# Patient Record
Sex: MALE | Race: WHITE | NOT HISPANIC OR LATINO | Employment: OTHER | URBAN - NONMETROPOLITAN AREA
[De-identification: names, ages, dates, MRNs, and addresses within clinical notes are randomized per-mention and may not be internally consistent; named-entity substitution may affect disease eponyms.]

---

## 2023-11-08 PROBLEM — I25.10 ATHEROSCLEROSIS OF NATIVE CORONARY ARTERY OF NATIVE HEART WITHOUT ANGINA PECTORIS: Status: ACTIVE | Noted: 2023-11-08

## 2023-11-08 PROBLEM — Z98.61 STATUS POST CORONARY ANGIOPLASTY: Status: ACTIVE | Noted: 2023-11-08

## 2023-11-08 PROBLEM — E11.9 CONTROLLED TYPE 2 DIABETES MELLITUS WITHOUT COMPLICATION, WITHOUT LONG-TERM CURRENT USE OF INSULIN (MULTI): Status: ACTIVE | Noted: 2023-11-08

## 2023-11-08 PROBLEM — E78.5 DYSLIPIDEMIA: Status: ACTIVE | Noted: 2023-11-08

## 2023-11-08 PROBLEM — I10 ESSENTIAL HYPERTENSION: Status: ACTIVE | Noted: 2023-11-08

## 2023-11-08 RX ORDER — AMLODIPINE BESYLATE 10 MG/1
1 TABLET ORAL DAILY
COMMUNITY
Start: 2021-05-04 | End: 2023-11-09

## 2023-11-08 RX ORDER — PAROXETINE HYDROCHLORIDE 40 MG/1
40 TABLET, FILM COATED ORAL EVERY MORNING
COMMUNITY
Start: 2022-04-01

## 2023-11-08 RX ORDER — ATORVASTATIN CALCIUM 20 MG/1
1 TABLET, FILM COATED ORAL NIGHTLY
COMMUNITY
Start: 2022-04-05 | End: 2024-02-22 | Stop reason: SDUPTHER

## 2023-11-08 RX ORDER — NEBIVOLOL 10 MG/1
1 TABLET ORAL DAILY
COMMUNITY
Start: 2022-04-01 | End: 2024-02-22 | Stop reason: SDUPTHER

## 2023-11-08 RX ORDER — VALSARTAN AND HYDROCHLOROTHIAZIDE 320; 25 MG/1; MG/1
1 TABLET, FILM COATED ORAL DAILY
COMMUNITY
Start: 2021-05-04 | End: 2024-02-22 | Stop reason: SDUPTHER

## 2023-11-08 RX ORDER — ASPIRIN 81 MG/1
1 TABLET ORAL DAILY
COMMUNITY
End: 2024-02-22 | Stop reason: SDUPTHER

## 2023-11-08 RX ORDER — METFORMIN HYDROCHLORIDE 500 MG/1
2 TABLET, EXTENDED RELEASE ORAL 2 TIMES DAILY
COMMUNITY

## 2023-11-09 ENCOUNTER — OFFICE VISIT (OUTPATIENT)
Dept: CARDIOLOGY | Facility: CLINIC | Age: 73
End: 2023-11-09
Payer: MEDICARE

## 2023-11-09 VITALS
BODY MASS INDEX: 37.77 KG/M2 | WEIGHT: 255 LBS | HEIGHT: 69 IN | SYSTOLIC BLOOD PRESSURE: 126 MMHG | DIASTOLIC BLOOD PRESSURE: 75 MMHG | HEART RATE: 72 BPM

## 2023-11-09 DIAGNOSIS — I10 ESSENTIAL HYPERTENSION: ICD-10-CM

## 2023-11-09 DIAGNOSIS — E78.5 DYSLIPIDEMIA: ICD-10-CM

## 2023-11-09 DIAGNOSIS — Z98.61 STATUS POST CORONARY ANGIOPLASTY: ICD-10-CM

## 2023-11-09 DIAGNOSIS — E11.9 CONTROLLED TYPE 2 DIABETES MELLITUS WITHOUT COMPLICATION, WITHOUT LONG-TERM CURRENT USE OF INSULIN (MULTI): ICD-10-CM

## 2023-11-09 DIAGNOSIS — I25.10 ATHEROSCLEROSIS OF NATIVE CORONARY ARTERY OF NATIVE HEART WITHOUT ANGINA PECTORIS: ICD-10-CM

## 2023-11-09 PROCEDURE — 3077F SYST BP >= 140 MM HG: CPT | Performed by: INTERNAL MEDICINE

## 2023-11-09 PROCEDURE — 3078F DIAST BP <80 MM HG: CPT | Performed by: INTERNAL MEDICINE

## 2023-11-09 PROCEDURE — 1036F TOBACCO NON-USER: CPT | Performed by: INTERNAL MEDICINE

## 2023-11-09 PROCEDURE — 3080F DIAST BP >= 90 MM HG: CPT | Performed by: INTERNAL MEDICINE

## 2023-11-09 PROCEDURE — 1159F MED LIST DOCD IN RCRD: CPT | Performed by: INTERNAL MEDICINE

## 2023-11-09 PROCEDURE — 99214 OFFICE O/P EST MOD 30 MIN: CPT | Performed by: INTERNAL MEDICINE

## 2023-11-09 PROCEDURE — 3074F SYST BP LT 130 MM HG: CPT | Performed by: INTERNAL MEDICINE

## 2023-11-09 RX ORDER — AMLODIPINE BESYLATE 5 MG/1
5 TABLET ORAL DAILY
Qty: 90 TABLET | Refills: 3 | Status: SHIPPED | OUTPATIENT
Start: 2023-11-09 | End: 2024-02-22 | Stop reason: SDUPTHER

## 2023-11-09 RX ORDER — AMLODIPINE BESYLATE 5 MG/1
5 TABLET ORAL DAILY
COMMUNITY
End: 2023-11-09 | Stop reason: SDUPTHER

## 2023-11-09 NOTE — PROGRESS NOTES
"Subjective   Jamal Bean is a 72 y.o. male       Chief Complaint    Follow-up          HPI   Patient is in the office for follow-up for the problems noted below.  He has had no cardiac events since he was last seen.  He denies any chest pain or dyspnea.  He tried to lose weight but has not been successful.  He maintains active lifestyle.  Does not smoke and does not drink alcohol excessively.  He has not needed to take nitroglycerin.  His examination essentially normal except for his obesity.  His lab data were requested and his medication were renewed at his request.  He will come back to see me in 6 months or sooner if needed.    ASSESSMENT AND PLAN:      1. Status post angioplasty, LAD and the marginal March 2017. He was advised to continue present medical therapy with no changes  2. controlled hypertension. He is to stay on present medical therapy with no changes  3. Dyslipidemia, on statin therapy to be monitored. Lipid profile is ordered  4.  Class II obesity. Encouraged the patient cut back calorie intake and exercise, his weight has dropped several pounds from last visit  5. diabetes on medical therapy managed by endocrinology.       [facility]io Forbes [facility], [facility]   Review of Systems   All other systems reviewed and are negative.         Visit Vitals  /90 (BP Location: Right arm, Patient Position: Sitting)   Pulse 72   Ht 1.753 m (5' 9\")   Wt 116 kg (255 lb)   BMI 37.66 kg/m²   Smoking Status Never   BSA 2.38 m²        Objective   Physical Exam  Constitutional:       Appearance: Normal appearance. He is normal weight.   HENT:      Nose: Nose normal.   Neck:      Vascular: No carotid bruit.   Cardiovascular:      Rate and Rhythm: Normal rate.      Pulses: Normal pulses.      Heart sounds: Normal heart sounds.   Pulmonary:      Effort: Pulmonary effort is normal.   Abdominal:      General: Bowel sounds are normal.      Palpations: Abdomen is soft.   Genitourinary:     Rectum: Normal.   Musculoskeletal:    "      General: Normal range of motion.      Cervical back: Normal range of motion.      Right lower leg: No edema.      Left lower leg: No edema.   Skin:     General: Skin is warm and dry.   Neurological:      General: No focal deficit present.      Mental Status: He is alert.   Psychiatric:         Mood and Affect: Mood normal.         Behavior: Behavior normal.         Thought Content: Thought content normal.         Judgment: Judgment normal.         Current Medications    Current Outpatient Medications:     amLODIPine (Norvasc) 5 mg tablet, Take 1 tablet (5 mg) by mouth once daily., Disp: , Rfl:     aspirin 81 mg EC tablet, Take 1 tablet (81 mg) by mouth once daily., Disp: , Rfl:     atorvastatin (Lipitor) 20 mg tablet, Take 1 tablet (20 mg) by mouth once daily at bedtime., Disp: , Rfl:     metFORMIN  mg 24 hr tablet, Take 2 tablets (1,000 mg) by mouth 2 times a day., Disp: , Rfl:     nebivolol (Bystolic) 10 mg tablet, Take 1 tablet (10 mg) by mouth once daily., Disp: , Rfl:     PARoxetine (Paxil) 40 mg tablet, Take 1 tablet (40 mg) by mouth once daily in the morning., Disp: , Rfl:     valsartan-hydrochlorothiazide (Diovan-HCT) 320-25 mg tablet, Take 1 tablet by mouth once daily., Disp: , Rfl:                      Assessment/Plan   1. Atherosclerosis of native coronary artery of native heart without angina pectoris        2. Status post coronary angioplasty        3. Essential hypertension        4. Dyslipidemia        5. Controlled type 2 diabetes mellitus without complication, without long-term current use of insulin (CMS/[facility])

## 2024-02-22 DIAGNOSIS — E78.5 DYSLIPIDEMIA: ICD-10-CM

## 2024-02-22 DIAGNOSIS — I25.10 ATHEROSCLEROSIS OF NATIVE CORONARY ARTERY OF NATIVE HEART WITHOUT ANGINA PECTORIS: ICD-10-CM

## 2024-02-22 DIAGNOSIS — I10 ESSENTIAL HYPERTENSION: ICD-10-CM

## 2024-02-23 RX ORDER — AMLODIPINE BESYLATE 5 MG/1
5 TABLET ORAL DAILY
Qty: 90 TABLET | Refills: 3 | Status: SHIPPED | OUTPATIENT
Start: 2024-02-23 | End: 2025-02-22

## 2024-02-23 RX ORDER — NEBIVOLOL 10 MG/1
10 TABLET ORAL DAILY
Qty: 90 TABLET | Refills: 3 | Status: SHIPPED | OUTPATIENT
Start: 2024-02-23 | End: 2024-05-09 | Stop reason: ALTCHOICE

## 2024-02-23 RX ORDER — ATORVASTATIN CALCIUM 20 MG/1
20 TABLET, FILM COATED ORAL NIGHTLY
Qty: 90 TABLET | Refills: 3 | Status: SHIPPED | OUTPATIENT
Start: 2024-02-23 | End: 2025-02-22

## 2024-02-23 RX ORDER — VALSARTAN AND HYDROCHLOROTHIAZIDE 320; 25 MG/1; MG/1
1 TABLET, FILM COATED ORAL DAILY
Qty: 90 TABLET | Refills: 3 | Status: SHIPPED | OUTPATIENT
Start: 2024-02-23 | End: 2025-02-22

## 2024-02-23 RX ORDER — ASPIRIN 81 MG/1
81 TABLET ORAL DAILY
Qty: 90 TABLET | Refills: 3 | Status: SHIPPED | OUTPATIENT
Start: 2024-02-23 | End: 2024-05-09 | Stop reason: DRUGHIGH

## 2024-05-09 ENCOUNTER — OFFICE VISIT (OUTPATIENT)
Dept: CARDIOLOGY | Facility: CLINIC | Age: 74
End: 2024-05-09
Payer: MEDICARE

## 2024-05-09 VITALS
SYSTOLIC BLOOD PRESSURE: 130 MMHG | WEIGHT: 249 LBS | HEIGHT: 69 IN | HEART RATE: 60 BPM | BODY MASS INDEX: 36.88 KG/M2 | DIASTOLIC BLOOD PRESSURE: 80 MMHG

## 2024-05-09 DIAGNOSIS — E78.5 DYSLIPIDEMIA: ICD-10-CM

## 2024-05-09 DIAGNOSIS — Z98.61 STATUS POST CORONARY ANGIOPLASTY: ICD-10-CM

## 2024-05-09 DIAGNOSIS — I48.91 ATRIAL FIBRILLATION, UNSPECIFIED TYPE (MULTI): ICD-10-CM

## 2024-05-09 DIAGNOSIS — I10 ESSENTIAL HYPERTENSION: ICD-10-CM

## 2024-05-09 DIAGNOSIS — E11.9 CONTROLLED TYPE 2 DIABETES MELLITUS WITHOUT COMPLICATION, WITHOUT LONG-TERM CURRENT USE OF INSULIN (MULTI): ICD-10-CM

## 2024-05-09 DIAGNOSIS — I25.10 ATHEROSCLEROSIS OF NATIVE CORONARY ARTERY OF NATIVE HEART WITHOUT ANGINA PECTORIS: ICD-10-CM

## 2024-05-09 DIAGNOSIS — Z87.891 FORMER SMOKER: ICD-10-CM

## 2024-05-09 PROCEDURE — 1036F TOBACCO NON-USER: CPT | Performed by: INTERNAL MEDICINE

## 2024-05-09 PROCEDURE — 1159F MED LIST DOCD IN RCRD: CPT | Performed by: INTERNAL MEDICINE

## 2024-05-09 PROCEDURE — 99214 OFFICE O/P EST MOD 30 MIN: CPT | Performed by: INTERNAL MEDICINE

## 2024-05-09 PROCEDURE — 3008F BODY MASS INDEX DOCD: CPT | Performed by: INTERNAL MEDICINE

## 2024-05-09 PROCEDURE — 3075F SYST BP GE 130 - 139MM HG: CPT | Performed by: INTERNAL MEDICINE

## 2024-05-09 PROCEDURE — 3079F DIAST BP 80-89 MM HG: CPT | Performed by: INTERNAL MEDICINE

## 2024-05-09 PROCEDURE — 93000 ELECTROCARDIOGRAM COMPLETE: CPT | Performed by: INTERNAL MEDICINE

## 2024-05-09 RX ORDER — ASPIRIN 81 MG/1
81 TABLET ORAL 2 TIMES WEEKLY
Qty: 24 TABLET | Refills: 3 | Status: SHIPPED | OUTPATIENT
Start: 2024-05-09 | End: 2025-05-09

## 2024-05-09 RX ORDER — METOPROLOL TARTRATE 50 MG/1
50 TABLET ORAL 2 TIMES DAILY
Qty: 180 TABLET | Refills: 3 | Status: SHIPPED | OUTPATIENT
Start: 2024-05-09 | End: 2025-05-09

## 2024-05-09 ASSESSMENT — ENCOUNTER SYMPTOMS: SHORTNESS OF BREATH: 1

## 2024-05-09 NOTE — PROGRESS NOTES
Subjective   Jamal Bean is a 73 y.o. male       Chief Complaint    Follow-up          HPI   Patient is in the office for follow-up for the problems noted below and reports no symptoms of palpitations, chest pain dyspnea orthopnea PND or lower extremity edema, he remains significantly obese and class II, he tells me he does not follow with diabetes specialist and does not follow with his PCP.  He was encouraged to follow-up with his primary care physicians.  He was found to have irregular rhythm on examination confirmed by EKG to be due to atrial fibrillation with a heart rate 106 bpm.  He was not aware of it.  His lungs sounded normal he had no cardiac murmurs and no lower extremity edema.  He failed to do the blood work that we  requested back in November 2023.    ASSESSMENT AND PLAN:      1-atrial fibrillation discovered for the first time that was totally asymptomatic.  Patient was educated about atrial fibrillation specifically allergy and potential complications.  He is agreeable to follow my recommendations of starting Xarelto 20 mg daily and switching from nebivolol to metoprolol to tartrate 50 mg twice daily.  We will not initiate antiarrhythmic therapy and now plans for cardioversion under adequate anticoagulation has been put in place.  Will obtain echocardiogram to assess ejection fraction, he would likely be started initially on amiodarone Tridestra normal sinus rhythm followed by cardioversion  2. Status post angioplasty, LAD and the marginal March 2017. He was advised to continue present medical therapy with no changes  3. controlled hypertension. He is to stay on present medical therapy with no changes  4. Dyslipidemia, on statin therapy to be monitored. Lipid profile is ordered  5.  Class II obesity. Encouraged the patient cut back calorie intake and exercise, his weight has dropped several pounds from last visit  6. diabetes on medical therapy managed by endocrinology.  He was advised to make  "sure that he has routine follow-up with his diabetes specialist which she has failed to do        Osorio Forbes MD, [facility]   Review of Systems   Respiratory:  Positive for shortness of breath.    All other systems reviewed and are negative.           Vitals:    05/09/24 0937 05/09/24 0948   BP: (!) 140/100 130/80   BP Location: Left arm Right arm   Patient Position: Sitting    Pulse: 60    Weight: 113 kg (249 lb)    Height: 1.753 m (5' 9\")         Objective   Physical Exam  Constitutional:       Appearance: Normal appearance.   HENT:      Nose: Nose normal.   Neck:      Vascular: No carotid bruit.   Cardiovascular:      Rate and Rhythm: Tachycardia present. Rhythm irregularly irregular.      Pulses: Normal pulses.      Heart sounds: Normal heart sounds.   Pulmonary:      Effort: Pulmonary effort is normal.   Abdominal:      General: Bowel sounds are normal.      Palpations: Abdomen is soft.   Musculoskeletal:         General: Normal range of motion.      Cervical back: Normal range of motion.      Right lower leg: No edema.      Left lower leg: No edema.   Skin:     General: Skin is warm and dry.   Neurological:      General: No focal deficit present.      Mental Status: He is alert.   Psychiatric:         Mood and Affect: Mood normal.         Behavior: Behavior normal.         Thought Content: Thought content normal.         Judgment: Judgment normal.         Allergies  Patient has no known allergies.     Current Medications    Current Outpatient Medications:     amLODIPine (Norvasc) 5 mg tablet, Take 1 tablet (5 mg) by mouth once daily., Disp: 90 tablet, Rfl: 3    aspirin 81 mg EC tablet, Take 1 tablet (81 mg) by mouth once daily., Disp: 90 tablet, Rfl: 3    atorvastatin (Lipitor) 20 mg tablet, Take 1 tablet (20 mg) by mouth once daily at bedtime., Disp: 90 tablet, Rfl: 3    metFORMIN  mg 24 hr tablet, Take 2 tablets (1,000 mg) by mouth 2 times a day., Disp: , Rfl:     nebivolol (Bystolic) 10 mg tablet, " Take 1 tablet (10 mg) by mouth once daily., Disp: 90 tablet, Rfl: 3    PARoxetine (Paxil) 40 mg tablet, Take 1 tablet (40 mg) by mouth once daily in the morning., Disp: , Rfl:     valsartan-hydrochlorothiazide (Diovan-HCT) 320-25 mg tablet, Take 1 tablet by mouth once daily., Disp: 90 tablet, Rfl: 3                     Assessment/Plan   1. Atrial fibrillation, unspecified type (Multi)        2. Atherosclerosis of native coronary artery of native heart without angina pectoris  Follow Up In Cardiology      3. Status post coronary angioplasty  Follow Up In Cardiology      4. Essential hypertension  Follow Up In Cardiology      5. Dyslipidemia        6. Controlled type 2 diabetes mellitus without complication, without long-term current use of insulin (Multi)        7. Body mass index (BMI) 36.0-36.9, adult        8. Former smoker                 Scribe Attestation  By signing my name below, Marina RAREGUIN LPN, Scribe   attest that this documentation has been prepared under the direction and in the presence of Osorio Forbes MD.     Provider Attestation - Scribe documentation    All medical record entries made by the Scribe were at my direction and personally dictated by me. I have reviewed the chart and agree that the record accurately reflects my personal performance of the history, physical exam, discussion and plan.

## 2024-05-09 NOTE — PATIENT INSTRUCTIONS
Please bring all medicines, vitamins, and herbal supplements with you when you come to the office.    Prescriptions will not be filled unless you are compliant with your follow up appointments or have a follow up appointment scheduled as per instruction of your physician. Refills should be requested at the time of your visit.     BMI was above normal measurement. Current weight: 113 kg (249 lb)  Weight change since last visit (-) denotes wt loss -6 lbs   Weight loss needed to achieve BMI 25: 80.1 Lbs  Weight loss needed to achieve BMI 30: 46.3 Lbs  Provided instructions on dietary changes.    Echo   B/p check one month  Follow up 6 weeks    Stop Bystolic  Start Metoprolol tart 50 mg one tablet two times daily  Xarelto 20mg daily with meal  Lab prior to vist

## 2024-05-09 NOTE — LETTER
May 9, 2024     Micky Lawrence MD  [address]  [facility] [address]    Patient: Jamal Bean   YOB: 1950   Date of Visit: 5/9/2024       Dear Dr. Micky Lawrence MD:    Thank you for referring Jamal Bean to me for evaluation. Below are my notes for this consultation.  If you have questions, please do not hesitate to call me. I look forward to following your patient along with you.       Sincerely,     Osorio Forbes MD      CC: No Recipients  ______________________________________________________________________________________    Subjective   Jamal Bean is a 73 y.o. male       Chief Complaint    Follow-up          HPI   Patient is in the office for follow-up for the problems noted below and reports no symptoms of palpitations, chest pain dyspnea orthopnea PND or lower extremity edema, he remains significantly obese and class II, he tells me he does not follow with diabetes specialist and does not follow with his PCP.  He was encouraged to follow-up with his primary care physicians.  He was found to have irregular rhythm on examination confirmed by EKG to be due to atrial fibrillation with a heart rate 106 bpm.  He was not aware of it.  His lungs sounded normal he had no cardiac murmurs and no lower extremity edema.  He failed to do the blood work that we  requested back in November 2023.    ASSESSMENT AND PLAN:      1-atrial fibrillation discovered for the first time that was totally asymptomatic.  Patient was educated about atrial fibrillation specifically allergy and potential complications.  He is agreeable to follow my recommendations of starting Xarelto 20 mg daily and switching from nebivolol to metoprolol to tartrate 50 mg twice daily.  We will not initiate antiarrhythmic therapy and now plans for cardioversion under adequate anticoagulation has been put in place.  Will obtain echocardiogram to assess ejection fraction, he would likely be started initially on amiodarone  "Amaya normal sinus rhythm followed by cardioversion  2. Status post angioplasty, LAD and the marginal March 2017. He was advised to continue present medical therapy with no changes  3. controlled hypertension. He is to stay on present medical therapy with no changes  4. Dyslipidemia, on statin therapy to be monitored. Lipid profile is ordered  5.  Class II obesity. Encouraged the patient cut back calorie intake and exercise, his weight has dropped several pounds from last visit  6. diabetes on medical therapy managed by endocrinology.  He was advised to make sure that he has routine follow-up with his diabetes specialist which she has failed to do        Osorio Forbes MD, [facility]   Review of Systems   Respiratory:  Positive for shortness of breath.    All other systems reviewed and are negative.           Vitals:    05/09/24 0937 05/09/24 0948   BP: (!) 140/100 130/80   BP Location: Left arm Right arm   Patient Position: Sitting    Pulse: 60    Weight: 113 kg (249 lb)    Height: 1.753 m (5' 9\")         Objective   Physical Exam  Constitutional:       Appearance: Normal appearance.   HENT:      Nose: Nose normal.   Neck:      Vascular: No carotid bruit.   Cardiovascular:      Rate and Rhythm: Tachycardia present. Rhythm irregularly irregular.      Pulses: Normal pulses.      Heart sounds: Normal heart sounds.   Pulmonary:      Effort: Pulmonary effort is normal.   Abdominal:      General: Bowel sounds are normal.      Palpations: Abdomen is soft.   Musculoskeletal:         General: Normal range of motion.      Cervical back: Normal range of motion.      Right lower leg: No edema.      Left lower leg: No edema.   Skin:     General: Skin is warm and dry.   Neurological:      General: No focal deficit present.      Mental Status: He is alert.   Psychiatric:         Mood and Affect: Mood normal.         Behavior: Behavior normal.         Thought Content: Thought content normal.         Judgment: Judgment normal. "         Allergies  Patient has no known allergies.     Current Medications    Current Outpatient Medications:   •  amLODIPine (Norvasc) 5 mg tablet, Take 1 tablet (5 mg) by mouth once daily., Disp: 90 tablet, Rfl: 3  •  aspirin 81 mg EC tablet, Take 1 tablet (81 mg) by mouth once daily., Disp: 90 tablet, Rfl: 3  •  atorvastatin (Lipitor) 20 mg tablet, Take 1 tablet (20 mg) by mouth once daily at bedtime., Disp: 90 tablet, Rfl: 3  •  metFORMIN  mg 24 hr tablet, Take 2 tablets (1,000 mg) by mouth 2 times a day., Disp: , Rfl:   •  nebivolol (Bystolic) 10 mg tablet, Take 1 tablet (10 mg) by mouth once daily., Disp: 90 tablet, Rfl: 3  •  PARoxetine (Paxil) 40 mg tablet, Take 1 tablet (40 mg) by mouth once daily in the morning., Disp: , Rfl:   •  valsartan-hydrochlorothiazide (Diovan-HCT) 320-25 mg tablet, Take 1 tablet by mouth once daily., Disp: 90 tablet, Rfl: 3                     Assessment/Plan   1. Atrial fibrillation, unspecified type (Multi)        2. Atherosclerosis of native coronary artery of native heart without angina pectoris  Follow Up In Cardiology      3. Status post coronary angioplasty  Follow Up In Cardiology      4. Essential hypertension  Follow Up In Cardiology      5. Dyslipidemia        6. Controlled type 2 diabetes mellitus without complication, without long-term current use of insulin (Multi)        7. Body mass index (BMI) 36.0-36.9, adult        8. Former smoker                 Scribe Attestation  By signing my name below, Marina ARREGUIN LPN, Scribe   attest that this documentation has been prepared under the direction and in the presence of Osorio Forbes MD.     Provider Attestation - Scribe documentation    All medical record entries made by the Scribe were at my direction and personally dictated by me. I have reviewed the chart and agree that the record accurately reflects my personal performance of the history, physical exam, discussion and plan.

## 2024-06-11 ENCOUNTER — APPOINTMENT (OUTPATIENT)
Dept: CARDIOLOGY | Facility: CLINIC | Age: 74
End: 2024-06-11
Payer: MEDICARE

## 2024-06-19 LAB
NON-UH HIE ALANINE AMINOTRANSFERASE: 93 U/L (ref 7–52)
NON-UH HIE ANION GAP: 11.8 (ref 6–15)
NON-UH HIE ASPARTATE AMINO TRANSFERASE: 47 U/L (ref 13–39)
NON-UH HIE BASOPHILS # (AUTO): 0.1 10*3/UL (ref 0–0.2)
NON-UH HIE BASOPHILS % (AUTO): 2 %
NON-UH HIE BLOOD UREA NITROGEN: 14 MG/DL (ref 7–25)
NON-UH HIE CALCIUM: 9.7 MG/DL (ref 8.6–10.3)
NON-UH HIE CARBON DIOXIDE: 32.7 MMOL/L (ref 21–31)
NON-UH HIE CHLORIDE: 99 MMOL/L (ref 98–107)
NON-UH HIE CHOL/HDL RATIO: 6.3
NON-UH HIE CHOLESTEROL: 232 MG/DL (ref 140–200)
NON-UH HIE CREATININE: 0.87 MG/DL (ref 0.7–1.3)
NON-UH HIE EOSINOPHILS # (AUTO): 0.2 10*3/UL (ref 0–0.45)
NON-UH HIE EOSINOPHILS % (AUTO): 3.3 %
NON-UH HIE ESTIMATED GFR: > 60
NON-UH HIE GLUCOSE: 300 MG/DL (ref 70–100)
NON-UH HIE HDL CHOLESTEROL: 37 MG/DL (ref 23–92)
NON-UH HIE HEMATOCRIT: 44.2 % (ref 38.8–50)
NON-UH HIE HEMOGLOBIN: 15.3 G/DL (ref 13–17)
NON-UH HIE LDL CHOLESTEROL,CALCULATED: 154 MG/DL (ref 0–100)
NON-UH HIE LYMPHOCYTES # (AUTO): 1.9 10*3/UL (ref 1–4.8)
NON-UH HIE LYMPHOCYTES % (AUTO): 30.6 %
NON-UH HIE MEAN CORPUSCULAR HEMOGLOBIN: 31.2 PG (ref 27.5–35.2)
NON-UH HIE MEAN CORPUSCULAR HGB CONC: 34.7 G/DL (ref 32.5–35.6)
NON-UH HIE MEAN CORPUSCULAR VOLUME: 89.8 FL (ref 83.5–101)
NON-UH HIE MEAN PLATELET VOLUME: 9.4 FL (ref 6.6–10.1)
NON-UH HIE MONOCYTES # (AUTO): 0.5 10*3/UL (ref 0–0.8)
NON-UH HIE MONOCYTES % (AUTO): 8.5 %
NON-UH HIE NEUTROPHILS # (AUTO): 3.5 10*3/UL (ref 1.8–7.7)
NON-UH HIE NEUTROPHILS % (AUTO): 55.6 %
NON-UH HIE NRBC%: 0 /100{WBC} (ref 0–0.5)
NON-UH HIE PLATELET COUNT: 131 10*3/UL (ref 150–450)
NON-UH HIE POTASSIUM: 4.5 MMOL/L (ref 3.5–5.1)
NON-UH HIE RED BLOOD COUNT: 4.92 (ref 3.9–5.6)
NON-UH HIE RED CELL DISTRIBUTION WIDTH: 13.4 % (ref 12–14.8)
NON-UH HIE SODIUM: 139 MMOL/L (ref 136–145)
NON-UH HIE TRIGLYCERIDE W/REFLEX: 204 MG/DL (ref 0–149)
NON-UH HIE UNCORRECTED WBC: 6.3 10*3/UL (ref 4.1–10.5)
NON-UH HIE VLDL CHOLESTEROL: 40 MG/DL
NON-UH HIE WHITE BLOOD COUNT: 6.3 10*3/UL (ref 4.1–10.5)

## 2024-06-24 ENCOUNTER — TELEPHONE (OUTPATIENT)
Dept: CARDIOLOGY | Facility: CLINIC | Age: 74
End: 2024-06-24

## 2024-06-24 ENCOUNTER — APPOINTMENT (OUTPATIENT)
Dept: CARDIOLOGY | Facility: CLINIC | Age: 74
End: 2024-06-24
Payer: MEDICARE

## 2024-06-24 VITALS
SYSTOLIC BLOOD PRESSURE: 150 MMHG | BODY MASS INDEX: 36.29 KG/M2 | DIASTOLIC BLOOD PRESSURE: 88 MMHG | WEIGHT: 245 LBS | HEIGHT: 69 IN | HEART RATE: 94 BPM

## 2024-06-24 DIAGNOSIS — I48.91 ATRIAL FIBRILLATION, UNSPECIFIED TYPE (MULTI): ICD-10-CM

## 2024-06-24 DIAGNOSIS — E78.5 DYSLIPIDEMIA: ICD-10-CM

## 2024-06-24 PROCEDURE — 93000 ELECTROCARDIOGRAM COMPLETE: CPT | Performed by: INTERNAL MEDICINE

## 2024-06-24 RX ORDER — ROSUVASTATIN CALCIUM 10 MG/1
10 TABLET, COATED ORAL DAILY
Qty: 90 TABLET | Refills: 3 | Status: SHIPPED | OUTPATIENT
Start: 2024-06-24 | End: 2025-06-24

## 2024-06-24 RX ORDER — EZETIMIBE 10 MG/1
10 TABLET ORAL DAILY
Qty: 90 TABLET | Refills: 3 | Status: SHIPPED | OUTPATIENT
Start: 2024-06-24 | End: 2025-06-24

## 2024-06-24 NOTE — PROGRESS NOTES
"Patient here for at EKG visit ordered by Dr. Forbes due to A-Fib. Dr. Forbes in suite. Patient here due to switching from Nebivolol to Metoprlool, starting Xarelto. Medication list Updated verbally. Patient has no cardiac complaints at time of visit. Discussed with Milena Suresh NP prior to discharge.     To Dr. Forbes for review        Vitals:    06/24/24 1405   BP: 150/88   BP Location: Left arm   Patient Position: Sitting   Pulse: 94   Weight: 111 kg (245 lb)   Height: 1.753 m (5' 9\")       EKG done in office today    "

## 2024-06-24 NOTE — TELEPHONE ENCOUNTER
Result Communication    Resulted Orders   [facility] Complete Blood Count Auto Diff   Result Value Ref Range    [facility] White Blood Count 6.3 4.1 - 10.5 10*3/uL    [facility] Uncorrected WBC 6.3 4.1 - 10.5 10*3/uL    [facility] Red Blood Count 4.92 3.90 - 5.60    [facility] Hemoglobin 15.3 13.0 - 17.0 g/dL    [facility] Hematocrit 44.2 38.8 - 50.0 %    [facility] Mean Corpuscular Volume 89.8 83.5 - 101 fL    [facility] Mean Corpuscular Hemoglobin 31.2 27.5 - 35.2 pg    [facility] Mean Corpuscular HGB Conc 34.7 32.5 - 35.6 g/dL    [facility] Red Cell Distribution Width 13.4 12.0 - 14.8 %    [facility] Platelet Count 131 (L) 150 - 450 10*3/uL    [facility] Mean Platelet Volume 9.4 6.6 - 10.1 fL    [facility] Neutrophils % (Auto) 55.6 . %    [facility] Lymphocytes % (Auto) 30.6 . %    [facility] Monocytes % (Auto) 8.5 . %    [facility] Eosinophils % (Auto) 3.3 . %    [facility] Basophils % (Auto) 2.0 . %    [facility] NRBC% 0.0 0 - 0.5 /100[WBC]    [facility] Neutrophils # (Auto) 3.5 1.8 - 7.7 10*3/uL    [facility] Lymphocytes # (Auto) 1.9 1.00 - 4.8 10*3/uL    [facility] Monocytes # (Auto) 0.5 0.0 - 0.8 10*3/uL    [facility] Eosinophils # (Auto) 0.2 0.0 - 0.45 10*3/uL    [facility] Basophils # (Auto) 0.1 0.0 - 0.2 10*3/uL      Comment:      PERFORMED BY:[id number] [place], [place] [id number] MEDICAL DIRECTORSAHIL SILVA M.D.   [facility] Basic Metabolic Panel   Result Value Ref Range    [facility] Glucose 300 (H) 70 - 100 mg/dL      Comment:         Random Glucose Reference Range is dependent on time and   content of last meal. Glucose of more than 200 mg/dL in a   nonstressed, ambulatory subject supports the diagnosis of   Diabetes Mellitus.   ADA recommended reference range    [facility] Blood Urea Nitrogen 14 7 - 25 mg/dL    [facility] Creatinine 0.87 0.70 - 1.30 mg/dL    [facility] ESTIMATED GFR > 60.0     [facility] Sodium 139 136 - 145 mmol/L    [facility]  Potassium 4.5 3.5 - 5.1 mmol/L    [facility] Chloride 99 98 - 107 mmol/L    [facility] Carbon Dioxide 32.7 (H) 21.0 - 31.0 mmol/L    [facility] Anion Gap 11.8 6.0 - 15.0    [facility] Calcium 9.7 8.6 - 10.3 mg/dL   [facility] Aspartate Amino Transferase   Result Value Ref Range    [facility] Aspartate Amino Transferase 47 (H) 13 - 39 U/L   [facility] Alanine Aminotransferase   Result Value Ref Range    [facility] Alanine Aminotransferase 93 (H) 7 - 52 U/L   [facility] Lipid Panel   Result Value Ref Range    [facility] Cholesterol 232 (H) 140 - 200 mg/dL      Comment:         Chol less than 200 mg/dl      low risk   Chol 201-239 mg/dl            borderline risk   Chol 240 mg/dl and greater    high risk    [facility] HDL Cholesterol 37 23 - 92 mg/dL      Comment:         HDL CHOL ATP-III CLASSIFICATION                              Cardiovascular                                  Risk      HDL > or equal to 60 mg/dL     LOW   HDL < 40 mg/dL                 HIGH    [facility] Triglyceride w/Reflex 204 (H) 0 - 149 mg/dL      Comment:         TRIG ATP III CLASSIFICATION   TRIG less than 150 mg/dL      Normal   TRIG 150-199 mg/dL            Borderline high   TRIG 200-500 mg/dL            High   TRIG greater than 500 mg/dL   Very high   Standard traceable to the Center for Disease Conrtrol and   Prevention (CDC) test method.    [facility] LDL Cholesterol,Calculated 154 (H) 0 - 100 mg/dL      Comment:         LDL ATP III CLASSIFICATION   LDL less than 100 mg/dL       Optimal   -129 mg/dL             Near or above optimal   -159 mg/dL             Borderline high   -189 mg/dL             High   LDL greater than 189 mg/dL    Very high    [facility] VLDL CHOLESTEROL 40 mg/dL    [facility] Chol/HDL Ratio 6.3 <5.0      Comment:      PERFORMED BY:[address] [address] [place], [place] [id number] MEDICAL DIRECTORSAHIL SILVA M.D.       3:04 PM      Results were successfully  communicated with the patient and they acknowledged their understanding.

## 2024-06-24 NOTE — TELEPHONE ENCOUNTER
----- Message from Osorio Forbes MD sent at 6/21/2024  4:55 PM EDT -----  Let him know the liver enzymes were slightly elevated and his LDL cholesterol is not on target.  My advice is to stop the Lipitor and start rosuvastatin 10 mg daily along with ezetimibe 10 mg daily, repeat lipid profile and LFT in 2 months  ----- Message -----  From: Jourdan Singh - Lab Results In  Sent: 6/19/2024   1:11 PM EDT  To: Osorio Forbes MD

## 2024-07-01 ENCOUNTER — TELEPHONE (OUTPATIENT)
Dept: CARDIOLOGY | Facility: CLINIC | Age: 74
End: 2024-07-01

## 2024-07-01 ENCOUNTER — APPOINTMENT (OUTPATIENT)
Dept: CARDIOLOGY | Facility: CLINIC | Age: 74
End: 2024-07-01
Payer: MEDICARE

## 2024-07-01 VITALS
SYSTOLIC BLOOD PRESSURE: 140 MMHG | DIASTOLIC BLOOD PRESSURE: 94 MMHG | WEIGHT: 239 LBS | BODY MASS INDEX: 35.4 KG/M2 | HEART RATE: 72 BPM | HEIGHT: 69 IN

## 2024-07-01 DIAGNOSIS — Z87.891 FORMER SMOKER: ICD-10-CM

## 2024-07-01 DIAGNOSIS — R74.01 TRANSAMINITIS: ICD-10-CM

## 2024-07-01 DIAGNOSIS — Z98.61 STATUS POST CORONARY ANGIOPLASTY: ICD-10-CM

## 2024-07-01 DIAGNOSIS — Z79.899 HIGH RISK MEDICATION USE: ICD-10-CM

## 2024-07-01 DIAGNOSIS — I48.0 PAROXYSMAL ATRIAL FIBRILLATION (MULTI): Primary | ICD-10-CM

## 2024-07-01 DIAGNOSIS — I25.10 ATHEROSCLEROSIS OF NATIVE CORONARY ARTERY OF NATIVE HEART WITHOUT ANGINA PECTORIS: ICD-10-CM

## 2024-07-01 DIAGNOSIS — E78.5 DYSLIPIDEMIA: ICD-10-CM

## 2024-07-01 DIAGNOSIS — I10 ESSENTIAL HYPERTENSION: ICD-10-CM

## 2024-07-01 DIAGNOSIS — E11.9 CONTROLLED TYPE 2 DIABETES MELLITUS WITHOUT COMPLICATION, WITHOUT LONG-TERM CURRENT USE OF INSULIN (MULTI): ICD-10-CM

## 2024-07-01 PROCEDURE — 3077F SYST BP >= 140 MM HG: CPT | Performed by: INTERNAL MEDICINE

## 2024-07-01 PROCEDURE — 99214 OFFICE O/P EST MOD 30 MIN: CPT | Performed by: INTERNAL MEDICINE

## 2024-07-01 PROCEDURE — 1036F TOBACCO NON-USER: CPT | Performed by: INTERNAL MEDICINE

## 2024-07-01 PROCEDURE — 1159F MED LIST DOCD IN RCRD: CPT | Performed by: INTERNAL MEDICINE

## 2024-07-01 PROCEDURE — 3080F DIAST BP >= 90 MM HG: CPT | Performed by: INTERNAL MEDICINE

## 2024-07-01 PROCEDURE — 3008F BODY MASS INDEX DOCD: CPT | Performed by: INTERNAL MEDICINE

## 2024-07-01 RX ORDER — AMLODIPINE BESYLATE 10 MG/1
10 TABLET ORAL DAILY
Qty: 90 TABLET | Refills: 3 | Status: SHIPPED | OUTPATIENT
Start: 2024-07-01 | End: 2025-07-01

## 2024-07-01 ASSESSMENT — ENCOUNTER SYMPTOMS
LIGHT-HEADEDNESS: 1
DYSPNEA ON EXERTION: 1

## 2024-07-01 NOTE — LETTER
July 1, 2024     Micky Lawrence MD  [address]  [facility] [address]    Patient: Jamal Bean   YOB: 1950   Date of Visit: 7/1/2024       Dear Dr. Micky Lawrence MD:    Thank you for referring Jamal Bean to me for evaluation. Below are my notes for this consultation.  If you have questions, please do not hesitate to call me. I look forward to following your patient along with you.       Sincerely,     Osorio Forbes MD      CC: No Recipients  ______________________________________________________________________________________    Subjective   Jamal Bean is a 73 y.o. male       Chief Complaint    Follow-up          HPI   Patient is in the office for follow-up for the problems noted below.  He converted back to normal sinus rhythm on amiodarone and remains in sinus rhythm.  He is feeling well without any symptoms of palpitations orthopnea PND or lower extremity edema.  He does get short of breath if he does heavy work such as mowing the lawn.  His weight dropped few pounds from last visit.  His pressure remains uncontrolled.  He he may have problem with alcohol abuse.  Lab data that were done recently that revealed elevation of transaminases.  I made a switch recently from Lipitor to rosuvastatin for uncontrolled hyperlipidemia.    ASSESSMENT AND PLAN:      1-paroxysmal atrial fibrillation, asymptomatic.  Patient converted to sinus rhythm on amiodarone without cardioversion.  Will maintain anticoagulation and amiodarone therapy and initiate amiodarone workup.  2. Status post angioplasty, LAD and the marginal March 2017.  Aggressive risk factors modification was emphasized  3.  Essential hypertension.  Currently uncontrolled.  Will increase amlodipine up to 10 mg daily and follow BP reading in few weeks  4. Dyslipidemia, LDL cholesterol remains above 150 mg/dL.  He was recently switched to rosuvastatin and will follow the lipid and liver function test.  5.  Class II obesity.  Lost  "few pounds from last visit and was encouraged to do more.  6.  Type II diabetes on medical therapy managed by endocrinology.  Continue present medical therapy and follow-up with PCP for management  7.  Elevated transaminase.  Will continue to monitor closely.        Osorio Forbes MD, [facility]   Review of Systems   Respiratory:  Positive for shortness of b  Review of Systems   Cardiovascular:  Positive for dyspnea on exertion.   Neurological:  Positive for light-headedness.   All other systems reviewed and are negative.           Vitals:    07/01/24 0830   BP: (!) 140/94   BP Location: Left arm   Patient Position: Sitting   Pulse: 72   Weight: 108 kg (239 lb)   Height: 1.753 m (5' 9\")        Objective   Physical Exam  Constitutional:       Appearance: Normal appearance.   HENT:      Nose: Nose normal.   Neck:      Vascular: No carotid bruit.   Cardiovascular:      Rate and Rhythm: Normal rate.      Pulses: Normal pulses.      Heart sounds: Normal heart sounds.   Pulmonary:      Effort: Pulmonary effort is normal.   Abdominal:      General: Bowel sounds are normal.      Palpations: Abdomen is soft.   Musculoskeletal:         General: Normal range of motion.      Cervical back: Normal range of motion.      Right lower leg: No edema.      Left lower leg: No edema.   Skin:     General: Skin is warm and dry.   Neurological:      General: No focal deficit present.      Mental Status: He is alert.   Psychiatric:         Mood and Affect: Mood normal.         Behavior: Behavior normal.         Thought Content: Thought content normal.         Judgment: Judgment normal.         Allergies  Patient has no known allergies.     Current Medications    Current Outpatient Medications:   •  aspirin 81 mg EC tablet, Take 1 tablet (81 mg) by mouth 2 times a week., Disp: 24 tablet, Rfl: 3  •  atorvastatin (Lipitor) 20 mg tablet, Take 1 tablet (20 mg) by mouth once daily at bedtime., Disp: 90 tablet, Rfl: 3  •  ezetimibe (Zetia) 10 mg " tablet, Take 1 tablet (10 mg) by mouth once daily., Disp: 90 tablet, Rfl: 3  •  metFORMIN  mg 24 hr tablet, Take 2 tablets (1,000 mg) by mouth 2 times a day., Disp: , Rfl:   •  metoprolol tartrate (Lopressor) 50 mg tablet, Take 1 tablet by mouth 2 times a day., Disp: 180 tablet, Rfl: 3  •  PARoxetine (Paxil) 40 mg tablet, Take 1 tablet (40 mg) by mouth once daily in the morning., Disp: , Rfl:   •  rivaroxaban (Xarelto) 20 mg tablet, Take 1 tablet (20 mg) by mouth once daily in the evening. Take with meals. Take with food., Disp: 90 tablet, Rfl: 3  •  rosuvastatin (Crestor) 10 mg tablet, Take 1 tablet (10 mg) by mouth once daily., Disp: 90 tablet, Rfl: 3  •  valsartan-hydrochlorothiazide (Diovan-HCT) 320-25 mg tablet, Take 1 tablet by mouth once daily., Disp: 90 tablet, Rfl: 3  •  amLODIPine (Norvasc) 10 mg tablet, Take 1 tablet (10 mg) by mouth once daily., Disp: 90 tablet, Rfl: 3                     Assessment/Plan   1. Atherosclerosis of native coronary artery of native heart without angina pectoris  Follow Up In Cardiology    amLODIPine (Norvasc) 10 mg tablet      2. Paroxysmal atrial fibrillation (Multi)  Aspartate Aminotransferase    Basic Metabolic Panel    Thyroid Stimulating Hormone    XR chest 2 views    Complete Pulmonary Function Test (Spirometry/DLCO/Lung Volumes)    Follow Up In Cardiology    Follow Up In Cardiology    Aspartate Aminotransferase    Basic Metabolic Panel    Thyroid Stimulating Hormone      3. Dyslipidemia        4. Essential hypertension  amLODIPine (Norvasc) 10 mg tablet      5. Status post coronary angioplasty        6. Controlled type 2 diabetes mellitus without complication, without long-term current use of insulin (Multi)        7. BMI 35.0-35.9,adult        8. Former smoker        9. High risk medication use  Aspartate Aminotransferase    Basic Metabolic Panel    Thyroid Stimulating Hormone    XR chest 2 views    Complete Pulmonary Function Test (Spirometry/DLCO/Lung Volumes)     Follow Up In Cardiology    Aspartate Aminotransferase    Basic Metabolic Panel    Thyroid Stimulating Hormone               Scribe Attestation  By signing my name below, I, Patsy CHAPPELL LPN  , Scribe   attest that this documentation has been prepared under the direction and in the presence of Osorio Forbes MD.     Provider Attestation - Scribe documentation    All medical record entries made by the Scribe were at my direction and personally dictated by me. I have reviewed the chart and agree that the record accurately reflects my personal performance of the history, physical exam, discussion and plan.

## 2024-07-01 NOTE — TELEPHONE ENCOUNTER
Patient is to go home and verify his medication. Patient presented to office for visit today with Dr. Osorio Forbes MD and is unsure if he is taking amlodipine for his blood pressure and amiodarone for his AFIB.     Please update med list when he calls and send to Dr. Osorio Forbes MD

## 2024-07-01 NOTE — PATIENT INSTRUCTIONS
Please bring all medicines, vitamins, and herbal supplements with you when you come to the office.    Prescriptions will not be filled unless you are compliant with your follow up appointments or have a follow up appointment scheduled as per instruction of your physician. Refills should be requested at the time of your visit.     Amiodarone follow up per routine     BMI was above normal measurement. Current weight: 108 kg (239 lb)  Weight change since last visit (-) denotes wt loss -6 lbs   Weight loss needed to achieve BMI 25: 70.1 Lbs  Weight loss needed to achieve BMI 30: 36.3 Lbs  Provided instructions on dietary changes.

## 2024-07-01 NOTE — PROGRESS NOTES
Subjective   Jamal Bean is a 73 y.o. male       Chief Complaint    Follow-up          HPI   Patient is in the office for follow-up for the problems noted below.  He converted back to normal sinus rhythm on amiodarone and remains in sinus rhythm.  He is feeling well without any symptoms of palpitations orthopnea PND or lower extremity edema.  He does get short of breath if he does heavy work such as mowing the lawn.  His weight dropped few pounds from last visit.  His pressure remains uncontrolled.  He he may have problem with alcohol abuse.  Lab data that were done recently that revealed elevation of transaminases.  I made a switch recently from Lipitor to rosuvastatin for uncontrolled hyperlipidemia.    ASSESSMENT AND PLAN:      1-paroxysmal atrial fibrillation, asymptomatic.  Patient converted to sinus rhythm on amiodarone without cardioversion.  Will maintain anticoagulation and amiodarone therapy and initiate amiodarone workup.  2. Status post angioplasty, LAD and the marginal March 2017.  Aggressive risk factors modification was emphasized  3.  Essential hypertension.  Currently uncontrolled.  Will increase amlodipine up to 10 mg daily and follow BP reading in few weeks  4. Dyslipidemia, LDL cholesterol remains above 150 mg/dL.  He was recently switched to rosuvastatin and will follow the lipid and liver function test.  5.  Class II obesity.  Lost few pounds from last visit and was encouraged to do more.  6.  Type II diabetes on medical therapy managed by endocrinology.  Continue present medical therapy and follow-up with PCP for management  7.  Elevated transaminase.  Will continue to monitor closely.        Osorio Forbes MD, FACC   Review of Systems   Respiratory:  Positive for shortness of b  Review of Systems   Cardiovascular:  Positive for dyspnea on exertion.   Neurological:  Positive for light-headedness.   All other systems reviewed and are negative.           Vitals:    07/01/24 0830   BP: (!)  "140/94   BP Location: Left arm   Patient Position: Sitting   Pulse: 72   Weight: 108 kg (239 lb)   Height: 1.753 m (5' 9\")        Objective   Physical Exam  Constitutional:       Appearance: Normal appearance.   HENT:      Nose: Nose normal.   Neck:      Vascular: No carotid bruit.   Cardiovascular:      Rate and Rhythm: Normal rate.      Pulses: Normal pulses.      Heart sounds: Normal heart sounds.   Pulmonary:      Effort: Pulmonary effort is normal.   Abdominal:      General: Bowel sounds are normal.      Palpations: Abdomen is soft.   Musculoskeletal:         General: Normal range of motion.      Cervical back: Normal range of motion.      Right lower leg: No edema.      Left lower leg: No edema.   Skin:     General: Skin is warm and dry.   Neurological:      General: No focal deficit present.      Mental Status: He is alert.   Psychiatric:         Mood and Affect: Mood normal.         Behavior: Behavior normal.         Thought Content: Thought content normal.         Judgment: Judgment normal.         Allergies  Patient has no known allergies.     Current Medications    Current Outpatient Medications:     aspirin 81 mg EC tablet, Take 1 tablet (81 mg) by mouth 2 times a week., Disp: 24 tablet, Rfl: 3    atorvastatin (Lipitor) 20 mg tablet, Take 1 tablet (20 mg) by mouth once daily at bedtime., Disp: 90 tablet, Rfl: 3    ezetimibe (Zetia) 10 mg tablet, Take 1 tablet (10 mg) by mouth once daily., Disp: 90 tablet, Rfl: 3    metFORMIN  mg 24 hr tablet, Take 2 tablets (1,000 mg) by mouth 2 times a day., Disp: , Rfl:     metoprolol tartrate (Lopressor) 50 mg tablet, Take 1 tablet by mouth 2 times a day., Disp: 180 tablet, Rfl: 3    PARoxetine (Paxil) 40 mg tablet, Take 1 tablet (40 mg) by mouth once daily in the morning., Disp: , Rfl:     rivaroxaban (Xarelto) 20 mg tablet, Take 1 tablet (20 mg) by mouth once daily in the evening. Take with meals. Take with food., Disp: 90 tablet, Rfl: 3    rosuvastatin " (Crestor) 10 mg tablet, Take 1 tablet (10 mg) by mouth once daily., Disp: 90 tablet, Rfl: 3    valsartan-hydrochlorothiazide (Diovan-HCT) 320-25 mg tablet, Take 1 tablet by mouth once daily., Disp: 90 tablet, Rfl: 3    amLODIPine (Norvasc) 10 mg tablet, Take 1 tablet (10 mg) by mouth once daily., Disp: 90 tablet, Rfl: 3                     Assessment/Plan   1. Atherosclerosis of native coronary artery of native heart without angina pectoris  Follow Up In Cardiology    amLODIPine (Norvasc) 10 mg tablet      2. Paroxysmal atrial fibrillation (Multi)  Aspartate Aminotransferase    Basic Metabolic Panel    Thyroid Stimulating Hormone    XR chest 2 views    Complete Pulmonary Function Test (Spirometry/DLCO/Lung Volumes)    Follow Up In Cardiology    Follow Up In Cardiology    Aspartate Aminotransferase    Basic Metabolic Panel    Thyroid Stimulating Hormone      3. Dyslipidemia        4. Essential hypertension  amLODIPine (Norvasc) 10 mg tablet      5. Status post coronary angioplasty        6. Controlled type 2 diabetes mellitus without complication, without long-term current use of insulin (Multi)        7. BMI 35.0-35.9,adult        8. Former smoker        9. High risk medication use  Aspartate Aminotransferase    Basic Metabolic Panel    Thyroid Stimulating Hormone    XR chest 2 views    Complete Pulmonary Function Test (Spirometry/DLCO/Lung Volumes)    Follow Up In Cardiology    Aspartate Aminotransferase    Basic Metabolic Panel    Thyroid Stimulating Hormone               Scribe Attestation  By signing my name below, I, Martha Gonzalez LPN   attest that this documentation has been prepared under the direction and in the presence of Osorio Forbes MD.     Provider Attestation - Scribe documentation    All medical record entries made by the Scribe were at my direction and personally dictated by me. I have reviewed the chart and agree that the record accurately reflects my personal performance of the history,  physical exam, discussion and plan.

## 2024-07-08 NOTE — TELEPHONE ENCOUNTER
Phoned patient unable to verify medications states he will phone office 7/9/24 with updated list. Has been feeling better after OV with medication changes.

## 2024-07-16 NOTE — TELEPHONE ENCOUNTER
He came into the office visit unsure of what he was taking for medications. He had hypertension and AFIB. We wanted to confirm if he was taking his amlodipine and what dose and if he was on his AMIO. He is not on amio and amlodipine dose is 10mg daily. Please advise if any changes need to be made from EKG/BP done last OV

## 2024-07-18 NOTE — TELEPHONE ENCOUNTER
Spoke with patient's daughter, she states she will have her father call our office to receive new orders.

## 2024-07-19 ENCOUNTER — APPOINTMENT (OUTPATIENT)
Dept: CARDIOLOGY | Facility: CLINIC | Age: 74
End: 2024-07-19
Payer: MEDICARE

## 2024-07-22 ENCOUNTER — APPOINTMENT (OUTPATIENT)
Dept: CARDIOLOGY | Facility: CLINIC | Age: 74
End: 2024-07-22
Payer: MEDICARE

## 2024-07-22 VITALS
HEART RATE: 80 BPM | HEIGHT: 69 IN | SYSTOLIC BLOOD PRESSURE: 120 MMHG | WEIGHT: 245 LBS | BODY MASS INDEX: 36.29 KG/M2 | DIASTOLIC BLOOD PRESSURE: 80 MMHG

## 2024-07-22 DIAGNOSIS — Z79.899 HIGH RISK MEDICATION USE: ICD-10-CM

## 2024-07-22 DIAGNOSIS — I48.0 PAROXYSMAL ATRIAL FIBRILLATION (MULTI): ICD-10-CM

## 2024-07-22 PROCEDURE — 3074F SYST BP LT 130 MM HG: CPT | Performed by: INTERNAL MEDICINE

## 2024-07-22 PROCEDURE — 99212 OFFICE O/P EST SF 10 MIN: CPT | Performed by: INTERNAL MEDICINE

## 2024-07-22 PROCEDURE — 3079F DIAST BP 80-89 MM HG: CPT | Performed by: INTERNAL MEDICINE

## 2024-07-22 PROCEDURE — 1036F TOBACCO NON-USER: CPT | Performed by: INTERNAL MEDICINE

## 2024-07-22 PROCEDURE — 1159F MED LIST DOCD IN RCRD: CPT | Performed by: INTERNAL MEDICINE

## 2024-07-22 PROCEDURE — 3008F BODY MASS INDEX DOCD: CPT | Performed by: INTERNAL MEDICINE

## 2024-07-22 RX ORDER — AMIODARONE HYDROCHLORIDE 200 MG/1
200 TABLET ORAL DAILY
COMMUNITY

## 2024-07-22 NOTE — LETTER
"July 22, 2024     Micky Lawrence MD  [address]  [facility] [address]    Patient: Jamal Bean   YOB: 1950   Date of Visit: 7/22/2024       Dear Dr. Micky Lawrence MD:    Thank you for referring Jamal Bena to me for evaluation. Below are my notes for this consultation.  If you have questions, please do not hesitate to call me. I look forward to following your patient along with you.       Sincerely,     Osorio Forbes MD      CC: No Recipients  ______________________________________________________________________________________    Subjective   Jamal Bean is a 73 y.o. male       Chief Complaint    Blood Pressure Check          HPI   Patient is in the office for hypertension management.  Since we had him on amlodipine 10 mg daily his pressure is completely under control without any side effects.  No further changes are needed.  ROS         Vitals:    07/22/24 1328 07/22/24 1332   BP: 130/82 120/80   BP Location: Left arm Right arm   Patient Position: Sitting Sitting   Pulse: 80    Weight: 111 kg (245 lb)    Height: 1.753 m (5' 9\")         Objective   Physical Exam    Allergies  Patient has no known allergies.     Current Medications    Current Outpatient Medications:   •  amiodarone (Pacerone) 200 mg tablet, Take 1 tablet (200 mg) by mouth once daily., Disp: , Rfl:   •  amLODIPine (Norvasc) 10 mg tablet, Take 1 tablet (10 mg) by mouth once daily., Disp: 90 tablet, Rfl: 3  •  aspirin 81 mg EC tablet, Take 1 tablet (81 mg) by mouth 2 times a week., Disp: 24 tablet, Rfl: 3  •  ezetimibe (Zetia) 10 mg tablet, Take 1 tablet (10 mg) by mouth once daily., Disp: 90 tablet, Rfl: 3  •  metFORMIN  mg 24 hr tablet, Take 2 tablets (1,000 mg) by mouth once daily in the evening. Take with meals., Disp: , Rfl:   •  metoprolol tartrate (Lopressor) 50 mg tablet, Take 1 tablet by mouth 2 times a day., Disp: 180 tablet, Rfl: 3  •  PARoxetine (Paxil) 40 mg tablet, Take 1 tablet (40 mg) by mouth " once daily in the morning., Disp: , Rfl:   •  rosuvastatin (Crestor) 10 mg tablet, Take 1 tablet (10 mg) by mouth once daily., Disp: 90 tablet, Rfl: 3  •  valsartan-hydrochlorothiazide (Diovan-HCT) 320-25 mg tablet, Take 1 tablet by mouth once daily., Disp: 90 tablet, Rfl: 3                     Assessment/Plan   1. Paroxysmal atrial fibrillation (Multi)  Follow Up In Cardiology      2. High risk medication use  Follow Up In Cardiology               Scribe Attestation  By signing my name below, I, Patsy CHAPPELL LPN  , Scribe   attest that this documentation has been prepared under the direction and in the presence of Osorio Forbes MD.     Provider Attestation - Scribe documentation    All medical record entries made by the Scribe were at my direction and personally dictated by me. I have reviewed the chart and agree that the record accurately reflects my personal performance of the history, physical exam, discussion and plan.

## 2024-07-22 NOTE — PROGRESS NOTES
"Subjective   Jamal Bean is a 73 y.o. male       Chief Complaint    Blood Pressure Check          HPI   Patient is in the office for hypertension management.  Since we had him on amlodipine 10 mg daily his pressure is completely under control without any side effects.  No further changes are needed.  ROS         Vitals:    07/22/24 1328 07/22/24 1332   BP: 130/82 120/80   BP Location: Left arm Right arm   Patient Position: Sitting Sitting   Pulse: 80    Weight: 111 kg (245 lb)    Height: 1.753 m (5' 9\")         Objective   Physical Exam    Allergies  Patient has no known allergies.     Current Medications    Current Outpatient Medications:     amiodarone (Pacerone) 200 mg tablet, Take 1 tablet (200 mg) by mouth once daily., Disp: , Rfl:     amLODIPine (Norvasc) 10 mg tablet, Take 1 tablet (10 mg) by mouth once daily., Disp: 90 tablet, Rfl: 3    aspirin 81 mg EC tablet, Take 1 tablet (81 mg) by mouth 2 times a week., Disp: 24 tablet, Rfl: 3    ezetimibe (Zetia) 10 mg tablet, Take 1 tablet (10 mg) by mouth once daily., Disp: 90 tablet, Rfl: 3    metFORMIN  mg 24 hr tablet, Take 2 tablets (1,000 mg) by mouth once daily in the evening. Take with meals., Disp: , Rfl:     metoprolol tartrate (Lopressor) 50 mg tablet, Take 1 tablet by mouth 2 times a day., Disp: 180 tablet, Rfl: 3    PARoxetine (Paxil) 40 mg tablet, Take 1 tablet (40 mg) by mouth once daily in the morning., Disp: , Rfl:     rosuvastatin (Crestor) 10 mg tablet, Take 1 tablet (10 mg) by mouth once daily., Disp: 90 tablet, Rfl: 3    valsartan-hydrochlorothiazide (Diovan-HCT) 320-25 mg tablet, Take 1 tablet by mouth once daily., Disp: 90 tablet, Rfl: 3                     Assessment/Plan   1. Paroxysmal atrial fibrillation (Multi)  Follow Up In Cardiology      2. High risk medication use  Follow Up In Cardiology               Scribe Attestation  By signing my name below, Patsy ARREGUIN LPN  , Scribe   attest that this documentation has been prepared " under the direction and in the presence of Osorio Forbes MD.     Provider Attestation - Scribe documentation    All medical record entries made by the Scribe were at my direction and personally dictated by me. I have reviewed the chart and agree that the record accurately reflects my personal performance of the history, physical exam, discussion and plan.

## 2024-08-05 ENCOUNTER — TELEPHONE (OUTPATIENT)
Dept: CARDIOLOGY | Facility: CLINIC | Age: 74
End: 2024-08-05
Payer: MEDICARE

## 2024-08-05 NOTE — TELEPHONE ENCOUNTER
We have an order for echo. Pt states he do not hink he need echo at this time after the 07-22-24 appt. Please advise

## 2024-08-06 LAB
NON-UH HIE ANION GAP: 12.6 (ref 6–15)
NON-UH HIE ASPARTATE AMINO TRANSFERASE: 30 U/L (ref 13–39)
NON-UH HIE BLOOD UREA NITROGEN: 11 MG/DL (ref 7–25)
NON-UH HIE CALCIUM: 9.6 MG/DL (ref 8.6–10.3)
NON-UH HIE CARBON DIOXIDE: 30.8 MMOL/L (ref 21–31)
NON-UH HIE CHLORIDE: 100 MMOL/L (ref 98–107)
NON-UH HIE CHOL/HDL RATIO: 3.4
NON-UH HIE CHOLESTEROL: 109 MG/DL (ref 140–200)
NON-UH HIE CREATININE: 0.88 MG/DL (ref 0.7–1.3)
NON-UH HIE ESTIMATED GFR: > 60
NON-UH HIE GLUCOSE: 275 MG/DL (ref 70–100)
NON-UH HIE HDL CHOLESTEROL: 32 MG/DL (ref 23–92)
NON-UH HIE LDL CHOLESTEROL,CALCULATED: 42 MG/DL (ref 0–100)
NON-UH HIE POTASSIUM: 4.4 MMOL/L (ref 3.5–5.1)
NON-UH HIE SODIUM: 139 MMOL/L (ref 136–145)
NON-UH HIE THYROID STIMULATING HORMONE: 2.97 U[IU]/ML (ref 0.45–5.33)
NON-UH HIE TRIGLYCERIDE W/REFLEX: 174 MG/DL (ref 0–149)
NON-UH HIE VLDL CHOLESTEROL: 34 MG/DL

## 2025-01-30 ENCOUNTER — APPOINTMENT (OUTPATIENT)
Dept: CARDIOLOGY | Facility: CLINIC | Age: 75
End: 2025-01-30
Payer: MEDICARE

## 2025-04-14 ENCOUNTER — APPOINTMENT (OUTPATIENT)
Dept: CARDIOLOGY | Facility: CLINIC | Age: 75
End: 2025-04-14
Payer: MEDICARE

## 2025-04-14 VITALS
SYSTOLIC BLOOD PRESSURE: 110 MMHG | HEART RATE: 72 BPM | BODY MASS INDEX: 34.51 KG/M2 | WEIGHT: 233 LBS | HEIGHT: 69 IN | DIASTOLIC BLOOD PRESSURE: 68 MMHG

## 2025-04-14 DIAGNOSIS — I25.10 ATHEROSCLEROSIS OF NATIVE CORONARY ARTERY OF NATIVE HEART WITHOUT ANGINA PECTORIS: ICD-10-CM

## 2025-04-14 DIAGNOSIS — Z79.899 HIGH RISK MEDICATION USE: ICD-10-CM

## 2025-04-14 DIAGNOSIS — Z98.61 STATUS POST CORONARY ANGIOPLASTY: ICD-10-CM

## 2025-04-14 DIAGNOSIS — Z87.891 FORMER SMOKER: ICD-10-CM

## 2025-04-14 DIAGNOSIS — I48.0 PAROXYSMAL ATRIAL FIBRILLATION (MULTI): ICD-10-CM

## 2025-04-14 DIAGNOSIS — I10 ESSENTIAL HYPERTENSION: ICD-10-CM

## 2025-04-14 DIAGNOSIS — E66.811 OBESITY, CLASS I, BMI 30-34.9: Primary | ICD-10-CM

## 2025-04-14 DIAGNOSIS — E78.5 DYSLIPIDEMIA: ICD-10-CM

## 2025-04-14 PROCEDURE — 3074F SYST BP LT 130 MM HG: CPT | Performed by: INTERNAL MEDICINE

## 2025-04-14 PROCEDURE — 1036F TOBACCO NON-USER: CPT | Performed by: INTERNAL MEDICINE

## 2025-04-14 PROCEDURE — 93000 ELECTROCARDIOGRAM COMPLETE: CPT | Performed by: INTERNAL MEDICINE

## 2025-04-14 PROCEDURE — 3078F DIAST BP <80 MM HG: CPT | Performed by: INTERNAL MEDICINE

## 2025-04-14 PROCEDURE — 99214 OFFICE O/P EST MOD 30 MIN: CPT | Performed by: INTERNAL MEDICINE

## 2025-04-14 PROCEDURE — 1159F MED LIST DOCD IN RCRD: CPT | Performed by: INTERNAL MEDICINE

## 2025-04-14 PROCEDURE — 3008F BODY MASS INDEX DOCD: CPT | Performed by: INTERNAL MEDICINE

## 2025-04-14 RX ORDER — EZETIMIBE 10 MG/1
10 TABLET ORAL DAILY
Qty: 90 TABLET | Refills: 3 | Status: SHIPPED | OUTPATIENT
Start: 2025-04-14 | End: 2026-04-14

## 2025-04-14 RX ORDER — AMLODIPINE BESYLATE 10 MG/1
10 TABLET ORAL DAILY
Qty: 90 TABLET | Refills: 3 | Status: SHIPPED | OUTPATIENT
Start: 2025-04-14 | End: 2026-04-14

## 2025-04-14 RX ORDER — METOPROLOL TARTRATE 50 MG/1
50 TABLET ORAL 2 TIMES DAILY
Qty: 180 TABLET | Refills: 3 | Status: SHIPPED | OUTPATIENT
Start: 2025-04-14 | End: 2026-04-14

## 2025-04-14 RX ORDER — VALSARTAN AND HYDROCHLOROTHIAZIDE 320; 25 MG/1; MG/1
1 TABLET, FILM COATED ORAL DAILY
Qty: 90 TABLET | Refills: 3 | Status: SHIPPED | OUTPATIENT
Start: 2025-04-14 | End: 2026-04-14

## 2025-04-14 RX ORDER — ROSUVASTATIN CALCIUM 10 MG/1
10 TABLET, COATED ORAL DAILY
Qty: 90 TABLET | Refills: 3 | Status: SHIPPED | OUTPATIENT
Start: 2025-04-14 | End: 2026-04-14

## 2025-04-14 RX ORDER — AMIODARONE HYDROCHLORIDE 200 MG/1
200 TABLET ORAL DAILY
Qty: 90 TABLET | Refills: 1 | Status: SHIPPED | OUTPATIENT
Start: 2025-04-14 | End: 2026-04-14

## 2025-04-14 NOTE — PROGRESS NOTES
Chief Complaint   Patient presents with    Follow-up     9 month Follow up for Atrial Fibrillation         Subjective   Jamal Bean is a 74 y.o. male     HPI   Patient is in the office for follow-up for paroxysmal atrial fibrillation, CAD, hypertension hyperlipidemia.  He is diabetic type II.  He has lost 11 pounds since he was last seen in the office and he was encouraged to do more and he was congratulated on his achievement.  His goal is 225 pounds I will ask him to bring it down down to 220 pounds.  Lab data from last summer demonstrated excellent lipid profile.  He has no side effect of medication he denies any breakthrough atrial fibrillation while he is on amiodarone.  EKG today confirms normal sinus rhythm with normal intervals.  Amiodarone testing scheduled this coming summer.  Examination was only remarkable for class I obesity.    ASSESSMENT AND PLAN:      1-paroxysmal atrial fibrillation, presently in sinus rhythm on amiodarone and long-term anticoagulation with Xarelto.    2.  Coronary artery disease  status post coronary angioplasty, involving LAD and the marginal March 2017.  Aggressive risk factors modification was emphasized  3.  Essential hypertension.  Currently controlled.  Present medical therapy will be left unchanged.  4. Dyslipidemia, LDL cholesterol below 70 mg/dL based on testing July 2024.  Continue rosuvastatin which has been well-tolerated  5.  Class I obesity.  Patient continues to lose weight by lifestyle modification, he was congratulated and advised to bring his weight further down.  6.  Type II diabetes on medical therapy managed by endocrinology.  Continue present medical therapy and follow-up with PCP for management  7-high risk medication with amiodarone and Xarelto both have been monitored closely with no toxicities    Review of Systems   All other systems reviewed and are negative.           Vitals:    04/14/25 1521   BP: 110/68   BP Location: Right arm   Patient Position:  "Sitting   Pulse: 72   Weight: 106 kg (233 lb)   Height: 1.753 m (5' 9\")        Objective   Physical Exam  Constitutional:       Appearance: Normal appearance.   HENT:      Nose: Nose normal.   Neck:      Vascular: No carotid bruit.   Cardiovascular:      Rate and Rhythm: Normal rate.      Pulses: Normal pulses.      Heart sounds: Normal heart sounds.   Pulmonary:      Effort: Pulmonary effort is normal.   Abdominal:      General: Bowel sounds are normal.      Palpations: Abdomen is soft.   Musculoskeletal:         General: Normal range of motion.      Cervical back: Normal range of motion.      Right lower leg: No edema.      Left lower leg: No edema.   Skin:     General: Skin is warm and dry.   Neurological:      General: No focal deficit present.      Mental Status: He is alert.   Psychiatric:         Mood and Affect: Mood normal.         Behavior: Behavior normal.         Thought Content: Thought content normal.         Judgment: Judgment normal.         Allergies  Patient has no known allergies.     Current Medications    Current Outpatient Medications:     aspirin 81 mg EC tablet, Take 1 tablet (81 mg) by mouth 2 times a week., Disp: 24 tablet, Rfl: 3    metFORMIN  mg 24 hr tablet, Take 2 tablets (1,000 mg) by mouth once daily in the evening. Take with meals., Disp: , Rfl:     PARoxetine (Paxil) 40 mg tablet, Take 1 tablet (40 mg) by mouth once daily in the morning., Disp: , Rfl:     amiodarone (Pacerone) 200 mg tablet, Take 1 tablet (200 mg) by mouth once daily., Disp: 90 tablet, Rfl: 1    amLODIPine (Norvasc) 10 mg tablet, Take 1 tablet (10 mg) by mouth once daily., Disp: 90 tablet, Rfl: 3    ezetimibe (Zetia) 10 mg tablet, Take 1 tablet (10 mg) by mouth once daily., Disp: 90 tablet, Rfl: 3    metoprolol tartrate (Lopressor) 50 mg tablet, Take 1 tablet by mouth 2 times a day., Disp: 180 tablet, Rfl: 3    rosuvastatin (Crestor) 10 mg tablet, Take 1 tablet (10 mg) by mouth once daily., Disp: 90 tablet, " Rfl: 3    valsartan-hydrochlorothiazide (Diovan-HCT) 320-25 mg tablet, Take 1 tablet by mouth once daily., Disp: 90 tablet, Rfl: 3                     Assessment/Plan   1. Paroxysmal atrial fibrillation (Multi)  Follow Up In Cardiology    ECG 12 Lead    Follow Up In Cardiology    Aspartate Aminotransferase    Basic Metabolic Panel    Thyroid Stimulating Hormone    XR chest 2 views    Complete Pulmonary Function Test (Spirometry/DLCO/Lung Volumes)    amiodarone (Pacerone) 200 mg tablet    Aspartate Aminotransferase    Basic Metabolic Panel    Thyroid Stimulating Hormone      2. Atherosclerosis of native coronary artery of native heart without angina pectoris  amLODIPine (Norvasc) 10 mg tablet      3. Dyslipidemia  ezetimibe (Zetia) 10 mg tablet    rosuvastatin (Crestor) 10 mg tablet      4. Essential hypertension  amLODIPine (Norvasc) 10 mg tablet    metoprolol tartrate (Lopressor) 50 mg tablet    valsartan-hydrochlorothiazide (Diovan-HCT) 320-25 mg tablet      5. Status post coronary angioplasty        6. Former smoker        7. BMI 34.0-34.9,adult        8. High risk medication use  ECG 12 Lead    Aspartate Aminotransferase    Basic Metabolic Panel    Thyroid Stimulating Hormone    XR chest 2 views    Complete Pulmonary Function Test (Spirometry/DLCO/Lung Volumes)    Aspartate Aminotransferase    Basic Metabolic Panel    Thyroid Stimulating Hormone      9. Atrial fibrillation, unspecified type (Multi)  metoprolol tartrate (Lopressor) 50 mg tablet               Scribe Attestation  By signing my name below, I, Vandana RAE RN   , Scribe   attest that this documentation has been prepared under the direction and in the presence of Osorio Forbes MD.     Provider Attestation - Scribe documentation    All medical record entries made by the Scribe were at my direction and personally dictated by me. I have reviewed the chart and agree that the record accurately reflects my personal performance of the history, physical  exam, discussion and plan.

## 2025-04-14 NOTE — LETTER
April 14, 2025     Micky Lawrence MD  [id number]  [facility] [address]    Patient: Jamal Bean   YOB: 1950   Date of Visit: 4/14/2025       Dear Dr. Micky Lawrence MD:    Thank you for referring Jamal Bean to me for evaluation. Below are my notes for this consultation.  If you have questions, please do not hesitate to call me. I look forward to following your patient along with you.       Sincerely,     Osorio Forbes MD      CC: No Recipients  ______________________________________________________________________________________    Chief Complaint   Patient presents with   • Follow-up     9 month Follow up for Atrial Fibrillation         Subjective   Jamal Bean is a 74 y.o. male     HPI   Patient is in the office for follow-up for paroxysmal atrial fibrillation, CAD, hypertension hyperlipidemia.  He is diabetic type II.  He has lost 11 pounds since he was last seen in the office and he was encouraged to do more and he was congratulated on his achievement.  His goal is 225 pounds I will ask him to bring it down down to 220 pounds.  Lab data from last summer demonstrated excellent lipid profile.  He has no side effect of medication he denies any breakthrough atrial fibrillation while he is on amiodarone.  EKG today confirms normal sinus rhythm with normal intervals.  Amiodarone testing scheduled this coming summer.  Examination was only remarkable for class I obesity.    ASSESSMENT AND PLAN:      1-paroxysmal atrial fibrillation, presently in sinus rhythm on amiodarone and long-term anticoagulation with Xarelto.    2.  Coronary artery disease  status post coronary angioplasty, involving LAD and the marginal March 2017.  Aggressive risk factors modification was emphasized  3.  Essential hypertension.  Currently controlled.  Present medical therapy will be left unchanged.  4. Dyslipidemia, LDL cholesterol below 70 mg/dL based on testing July 2024.  Continue rosuvastatin which has  "been well-tolerated  5.  Class I obesity.  Patient continues to lose weight by lifestyle modification, he was congratulated and advised to bring his weight further down.  6.  Type II diabetes on medical therapy managed by endocrinology.  Continue present medical therapy and follow-up with PCP for management  7-high risk medication with amiodarone and Xarelto both have been monitored closely with no toxicities    Review of Systems   All other systems reviewed and are negative.           Vitals:    04/14/25 1521   BP: 110/68   BP Location: Right arm   Patient Position: Sitting   Pulse: 72   Weight: 106 kg (233 lb)   Height: 1.753 m (5' 9\")        Objective   Physical Exam  Constitutional:       Appearance: Normal appearance.   HENT:      Nose: Nose normal.   Neck:      Vascular: No carotid bruit.   Cardiovascular:      Rate and Rhythm: Normal rate.      Pulses: Normal pulses.      Heart sounds: Normal heart sounds.   Pulmonary:      Effort: Pulmonary effort is normal.   Abdominal:      General: Bowel sounds are normal.      Palpations: Abdomen is soft.   Musculoskeletal:         General: Normal range of motion.      Cervical back: Normal range of motion.      Right lower leg: No edema.      Left lower leg: No edema.   Skin:     General: Skin is warm and dry.   Neurological:      General: No focal deficit present.      Mental Status: He is alert.   Psychiatric:         Mood and Affect: Mood normal.         Behavior: Behavior normal.         Thought Content: Thought content normal.         Judgment: Judgment normal.         Allergies  Patient has no known allergies.     Current Medications    Current Outpatient Medications:   •  aspirin 81 mg EC tablet, Take 1 tablet (81 mg) by mouth 2 times a week., Disp: 24 tablet, Rfl: 3  •  metFORMIN  mg 24 hr tablet, Take 2 tablets (1,000 mg) by mouth once daily in the evening. Take with meals., Disp: , Rfl:   •  PARoxetine (Paxil) 40 mg tablet, Take 1 tablet (40 mg) by mouth " once daily in the morning., Disp: , Rfl:   •  amiodarone (Pacerone) 200 mg tablet, Take 1 tablet (200 mg) by mouth once daily., Disp: 90 tablet, Rfl: 1  •  amLODIPine (Norvasc) 10 mg tablet, Take 1 tablet (10 mg) by mouth once daily., Disp: 90 tablet, Rfl: 3  •  ezetimibe (Zetia) 10 mg tablet, Take 1 tablet (10 mg) by mouth once daily., Disp: 90 tablet, Rfl: 3  •  metoprolol tartrate (Lopressor) 50 mg tablet, Take 1 tablet by mouth 2 times a day., Disp: 180 tablet, Rfl: 3  •  rosuvastatin (Crestor) 10 mg tablet, Take 1 tablet (10 mg) by mouth once daily., Disp: 90 tablet, Rfl: 3  •  valsartan-hydrochlorothiazide (Diovan-HCT) 320-25 mg tablet, Take 1 tablet by mouth once daily., Disp: 90 tablet, Rfl: 3                     Assessment/Plan   1. Paroxysmal atrial fibrillation (Multi)  Follow Up In Cardiology    ECG 12 Lead    Follow Up In Cardiology    Aspartate Aminotransferase    Basic Metabolic Panel    Thyroid Stimulating Hormone    XR chest 2 views    Complete Pulmonary Function Test (Spirometry/DLCO/Lung Volumes)    amiodarone (Pacerone) 200 mg tablet    Aspartate Aminotransferase    Basic Metabolic Panel    Thyroid Stimulating Hormone      2. Atherosclerosis of native coronary artery of native heart without angina pectoris  amLODIPine (Norvasc) 10 mg tablet      3. Dyslipidemia  ezetimibe (Zetia) 10 mg tablet    rosuvastatin (Crestor) 10 mg tablet      4. Essential hypertension  amLODIPine (Norvasc) 10 mg tablet    metoprolol tartrate (Lopressor) 50 mg tablet    valsartan-hydrochlorothiazide (Diovan-HCT) 320-25 mg tablet      5. Status post coronary angioplasty        6. Former smoker        7. BMI 34.0-34.9,adult        8. High risk medication use  ECG 12 Lead    Aspartate Aminotransferase    Basic Metabolic Panel    Thyroid Stimulating Hormone    XR chest 2 views    Complete Pulmonary Function Test (Spirometry/DLCO/Lung Volumes)    Aspartate Aminotransferase    Basic Metabolic Panel    Thyroid Stimulating  Hormone      9. Atrial fibrillation, unspecified type (Multi)  metoprolol tartrate (Lopressor) 50 mg tablet               Scribe Attestation  By signing my name below, I, Vandana RAE RN   , Scribe   attest that this documentation has been prepared under the direction and in the presence of Osorio Forbes MD.     Provider Attestation - Scribe documentation    All medical record entries made by the Scribe were at my direction and personally dictated by me. I have reviewed the chart and agree that the record accurately reflects my personal performance of the history, physical exam, discussion and plan.

## 2025-04-14 NOTE — PATIENT INSTRUCTIONS
Please bring all medicines, vitamins, and herbal supplements with you when you come to the office.    Prescriptions will not be filled unless you are compliant with your follow up appointments or have a follow up appointment scheduled as per instruction of your physician. Refills should be requested at the time of your visit.     Fall Prevention Education Given     BMI was above normal measurement. Current weight: 106 kg (233 lb)  Weight change since last visit (-) denotes wt loss -12 lbs   Weight loss needed to achieve BMI 25: 64.1 Lbs  Weight loss needed to achieve BMI 30: 30.3 Lbs  Provided instructions on dietary changes  Provided instructions on exercise.

## 2026-01-09 ENCOUNTER — APPOINTMENT (OUTPATIENT)
Dept: CARDIOLOGY | Facility: CLINIC | Age: 76
End: 2026-01-09
Payer: MEDICARE